# Patient Record
Sex: FEMALE | Race: WHITE | URBAN - METROPOLITAN AREA
[De-identification: names, ages, dates, MRNs, and addresses within clinical notes are randomized per-mention and may not be internally consistent; named-entity substitution may affect disease eponyms.]

---

## 2022-08-31 NOTE — PROGRESS NOTES
ANNUAL EXAM AGES 40-64      Palmira Joyner is a ,  59 y.o. female   No LMP recorded. Patient is postmenopausal.    She presents for her annual checkup. She is having no significant problems. Menstrual status:  Her periods are  absent patient is postmenopausal.    Contraception:  The current method of family planning is NA post menopause. Hormonal status:  She reports no perimenstrual type symptoms. She is not having vasomotor symptoms. The patient is not using any ERT. Sexual history:  She  reports being sexually active and has had partner(s) who are male. Medical conditions:  Since her last annual GYN exam about one year ago, she has not the following changes in her health history: none. Pap and Mammogram History:  Her most recent Pap smear was normal, obtained 4/15/2021. The patient had breast MRI 2021 negative. Gyn Flowsheet:  No flowsheet data found. Breast Cancer History: sister aunt. Osteoporosis History:  Family history does not include a first or second degree relative with osteopenia or osteoporosis. A bone density scan was obtained 2020 and revealed Osteoporosis. She is currently taking calcium and Prolia.       Medical History:  Patient Active Problem List   Diagnosis Code    Family history of breast cancer Z80.3    Osteoporosis M81.0       Past Medical History:   Diagnosis Date    Anxiety     BRCA negative 2021    Depression     Osteoporosis 2020    T-2.9 spine, T-1.5 hip  Prolia     Past Surgical History:   Procedure Laterality Date    HX BLEPHAROPLASTY Bilateral 2022    HX CARPAL TUNNEL RELEASE Right 2022    Trigger & Carpal    HX COLONOSCOPY       OB History    Para Term  AB Living   3 3 3 0 0 3   SAB IAB Ectopic Molar Multiple Live Births   0 0 0 0 0 3      # Outcome Date GA Lbr Onofre/2nd Weight Sex Delivery Anes PTL Lv   3 Term    8 lb 7 oz (3.827 kg)  Vag-Spont   GILBERTO   2 Term    8 lb 5 oz (3.771 kg)  Vag-Spont GILBERTO   1 Term    8 lb 6 oz (3.799 kg)  Vag-Spont   GLIBERTO     Social History     Socioeconomic History    Marital status:     Number of children: 3   Occupational History    Occupation: RN     Employer: [employer]     Comment: [employer]   Tobacco Use    Smoking status: Never    Smokeless tobacco: Never   Vaping Use    Vaping Use: Never used   Substance and Sexual Activity    Alcohol use: Yes    Drug use: Never    Sexual activity: Yes     Partners: Male      Family History   Problem Relation Age of Onset    Diabetes Father     High Cholesterol Father     Hypertension Father     Breast Cancer Sister 52    Breast Cancer Paternal Aunt 61     Current Outpatient Medications on File Prior to Visit   Medication Sig Dispense Refill    denosumab (Prolia) 60 mg/mL injection by SubCUTAneous route. verapamil ER (CALAN-SR) 240 mg CR tablet Take 240 mg by mouth daily. sertraline (ZOLOFT) 25 mg tablet Take  by mouth daily. cholecalciferol, vitamin D3, (Vitamin D3) 50 mcg (2,000 unit) tab Take  by mouth.      calcium carbonate/vitamin D3 (CALCIUM 600 + D,3, PO) Take  by mouth. No current facility-administered medications on file prior to visit.      Allergies   Allergen Reactions    Latex Itching and Rash     Lips swell    Penicillins Rash       Review of Systems:  History obtained from the patient-negative for:  Constitutional: weight loss, fever, night sweats  HEENT: hearing loss, earache, congestion, snoring, sorethroat  CV: chest pain, palpitations, edema  Resp: cough, shortness of breath, wheezing  Breast: breast lumps, nipple discharge, galactorrhea  GI: change in bowel habits, abdominal pain, black or bloody stools  : frequency, dysuria, hematuria, vaginal discharge  MSK: back pain, joint pain, muscle pain  Skin: itching, rash, hives  Neuro: dizziness, headache, confusion, weakness  Psych: anxiety, depression, change in mood  Heme/lymph: bleeding, bruising, pallor    Physical Exam  Visit Vitals  /78   Ht 5' 3.5\" (1.613 m)   Wt 155 lb (70.3 kg)   BMI 27.03 kg/m²       Constitutional  Appearance: well-nourished, well developed, alert, in no acute distress    HENT  Head and Face: appears normal    Neck  Inspection/Palpation: normal appearance, no masses or tenderness  Lymph Nodes: no lymphadenopathy present  Thyroid: gland size normal, nontender, no nodules or masses present on palpation    Chest  Respiratory Effort: breathing unlabored  Auscultation: normal breath sounds    Cardiovascular  Heart:   Auscultation: regular rate and rhythm without murmur    Breasts  Inspection of Breasts: breasts symmetrical, no skin changes, no discharge present, nipple appearance normal, no skin retraction present  Palpation of Breasts and Axillae: no masses present on palpation, no breast tenderness  Axillary Lymph Nodes: no lymphadenopathy present    Gastrointestinal  Abdominal Examination: abdomen non-tender to palpation, normal bowel sounds, no masses present  Liver and spleen: no hepatomegaly present, spleen not palpable  Hernias: no hernias identified    Genitourinary  External Genitalia: normal appearance for age, no discharge present, no tenderness present, no inflammatory lesions present, no masses present, atrophy present  Vagina: normal vaginal mild cycotocel, no discharge present, no inflammatory lesions present, no masses present, atrophy  Bladder: non-tender to palpation  Urethra: appears normal  Cervix: normal   Uterus: normal size, shape and consistency  Adnexa: no adnexal tenderness present, no adnexal masses present  Perineum: perineum within normal limits, no evidence of trauma, no rashes or skin lesions present  Anus: anus within normal limits, no hemorrhoids present,   Inguinal Lymph Nodes: no lymphadenopathy present    Skin  General Inspection: no rash, no lesions identified    Neurologic/Psychiatric  Mental Status:  Orientation: grossly oriented to person, place and time  Mood and Affect: mood normal, affect appropriate    Labs:  No results found for this or any previous visit (from the past 12 hour(s)). Assessment:    ICD-10-CM ICD-9-CM    1.  Routine gynecological examination  Z01.419 V72.31 PAP IG, APTIMA HPV AND RFX 16/18,45 ([id number])        Plan:  Counseled re: diet, exercise, healthy lifestyle  Rec annual mammogram/MRI  Return in about 1 year (around 9/1/2023) for Annual.

## 2022-09-01 ENCOUNTER — OFFICE VISIT (OUTPATIENT)
Dept: OBGYN CLINIC | Age: 57
End: 2022-09-01
Payer: COMMERCIAL

## 2022-09-01 VITALS
SYSTOLIC BLOOD PRESSURE: 118 MMHG | BODY MASS INDEX: 26.46 KG/M2 | DIASTOLIC BLOOD PRESSURE: 78 MMHG | WEIGHT: 155 LBS | HEIGHT: 64 IN

## 2022-09-01 DIAGNOSIS — Z01.419 ROUTINE GYNECOLOGICAL EXAMINATION: Primary | ICD-10-CM

## 2022-09-01 PROBLEM — M81.0 OSTEOPOROSIS: Status: ACTIVE | Noted: 2020-04-24

## 2022-09-01 PROBLEM — Z80.3 FAMILY HISTORY OF BREAST CANCER: Status: ACTIVE | Noted: 2022-09-01

## 2022-09-01 PROCEDURE — 99396 PREV VISIT EST AGE 40-64: CPT | Performed by: OBSTETRICS & GYNECOLOGY

## 2022-09-01 RX ORDER — CHOLECALCIFEROL (VITAMIN D3) 125 MCG
CAPSULE ORAL
COMMUNITY

## 2022-09-01 RX ORDER — DENOSUMAB 60 MG/ML
INJECTION SUBCUTANEOUS
COMMUNITY

## 2022-09-01 RX ORDER — VERAPAMIL HYDROCHLORIDE 240 MG/1
240 TABLET, FILM COATED, EXTENDED RELEASE ORAL DAILY
COMMUNITY
Start: 2022-07-30

## 2022-09-01 RX ORDER — SERTRALINE HYDROCHLORIDE 25 MG/1
TABLET, FILM COATED ORAL DAILY
COMMUNITY

## 2022-09-06 LAB
CYTOLOGIST CVX/VAG CYTO: NORMAL
CYTOLOGY CVX/VAG DOC CYTO: NORMAL
CYTOLOGY CVX/VAG DOC THIN PREP: NORMAL
DX ICD CODE: NORMAL
HPV I/H RISK 4 DNA CVX QL PROBE+SIG AMP: NEGATIVE
Lab: NORMAL
OTHER STN SPEC: NORMAL
STAT OF ADQ CVX/VAG CYTO-IMP: NORMAL

## 2023-09-15 NOTE — PROGRESS NOTES
Joseph Chappell is a 59 y.o. female returns for an annual exam     Chief Complaint   Patient presents with    Annual Exam       No LMP recorded. Patient is postmenopausal.  Her periods are absent in flow. Problems: no problems  Birth Control: post menopausal status. Last Pap: normal obtained 1 year(s) ago. She does not have a history of LINDA 2, 3 or cervical cancer. Last Mammogram: has not had a recent mammogram.     Last Bone Density: abnormal Osteoporosis obtained 4/24/2020. Last colonoscopy: normal obtained 2014. 1. Have you been to the ER, urgent care clinic, or hospitalized since your last visit? No    2. Have you seen or consulted any other health care providers outside of the [address] [place] [address] Avenue since your last visit? Right hip replacement 7/2023. Examination chaperoned by Rayna Armstrong.

## 2023-09-18 ENCOUNTER — OFFICE VISIT (OUTPATIENT)
Age: 58
End: 2023-09-18
Payer: COMMERCIAL

## 2023-09-18 VITALS
SYSTOLIC BLOOD PRESSURE: 139 MMHG | DIASTOLIC BLOOD PRESSURE: 87 MMHG | WEIGHT: 149.2 LBS | BODY MASS INDEX: 25.47 KG/M2 | HEIGHT: 64 IN

## 2023-09-18 DIAGNOSIS — Z01.419 WELL WOMAN EXAM WITH ROUTINE GYNECOLOGICAL EXAM: Primary | ICD-10-CM

## 2023-09-18 DIAGNOSIS — Z12.31 BREAST CANCER SCREENING BY MAMMOGRAM: ICD-10-CM

## 2023-09-18 PROBLEM — G56.03 BILATERAL CARPAL TUNNEL SYNDROME: Status: ACTIVE | Noted: 2022-06-08

## 2023-09-18 PROBLEM — M81.0 OSTEOPOROSIS: Status: ACTIVE | Noted: 2020-04-24

## 2023-09-18 PROCEDURE — 99396 PREV VISIT EST AGE 40-64: CPT | Performed by: OBSTETRICS & GYNECOLOGY

## 2023-09-18 RX ORDER — ASPIRIN 325 MG
TABLET, DELAYED RELEASE (ENTERIC COATED) ORAL
COMMUNITY
Start: 2023-07-05 | End: 2023-09-18

## 2023-09-18 NOTE — PROGRESS NOTES
ANNUAL EXAM AGES 40-64      History:  Fernando Gauthier is a 59y.o. year old  White (non-) female   No LMP recorded. Patient is postmenopausal.    She presents for her annual checkup. Had hip replacement   No LMP recorded. Patient is postmenopausal.  No PMB. Rare hot flash. Her periods are absent in flow. Problems: no problems  Birth Control: post menopausal status. Last Pap: normal obtained 1 year(s) ago. She does not have a history of LINDA 2, 3 or cervical cancer. Last Mammogram: has not had a recent mammogram.     Last Bone Density: abnormal Osteoporosis obtained 2020. Last colonoscopy: normal obtained . Problem List:  Patient Active Problem List    Diagnosis Date Noted    Family history of breast cancer 2022     Overview Note:     Sister  Aunt  BRCA Neg  Get Q6mo mammo        Bilateral carpal tunnel syndrome 2022     Overview Note:     Formatting of this note might be different from the original.  Added automatically from request for surgery [id number]      Osteoporosis 2020     Overview Note:     T-2.9 spine, T-1.5 hip  Prolia  Scan  still osteoporotic per patient.   Dr. Jose Luis Sawant         Past Medical History:   Diagnosis Date    Anxiety     BRCA negative 2021    Depression     Osteoporosis 2020    T-2.9 spine, T-1.5 hip  Prolia     Past Surgical History:   Procedure Laterality Date    BLEPHAROPLASTY Bilateral 2022    CARPAL TUNNEL RELEASE Right 2022    Trigger & Carpal    COLONOSCOPY  2014    TOTAL HIP ARTHROPLASTY Right 2023       OB History    Para Term  AB Living   3 3 3 0 0 3   SAB IAB Ectopic Molar Multiple Live Births   0 0 0 0 0 3      # Outcome Date GA Lbr Bucky/2nd Weight Sex Delivery Anes PTL Lv   3 Term    8 lb 7 oz (3.827 kg)  Vag-Spont   KATELYNN   2 Term    8 lb 5 oz (3.771 kg)  Vag-Spont   KATELYNN   1 Term    8 lb 6 oz (3.799 kg)  Vag-Spont   KATELYNN     Social History     Socioeconomic History    Marital

## 2023-09-23 LAB
CYTOLOGIST CVX/VAG CYTO: NORMAL
CYTOLOGY CVX/VAG DOC CYTO: NORMAL
CYTOLOGY CVX/VAG DOC THIN PREP: NORMAL
DX ICD CODE: NORMAL
HPV GENOTYPE REFLEX: NORMAL
HPV I/H RISK 4 DNA CVX QL PROBE+SIG AMP: NEGATIVE
Lab: NORMAL
OTHER STN SPEC: NORMAL
STAT OF ADQ CVX/VAG CYTO-IMP: NORMAL

## 2025-01-14 ENCOUNTER — OFFICE VISIT (OUTPATIENT)
Age: 60
End: 2025-01-14
Payer: COMMERCIAL

## 2025-01-14 VITALS
BODY MASS INDEX: 28.31 KG/M2 | DIASTOLIC BLOOD PRESSURE: 87 MMHG | SYSTOLIC BLOOD PRESSURE: 137 MMHG | HEIGHT: 64 IN | RESPIRATION RATE: 16 BRPM | WEIGHT: 165.8 LBS | HEART RATE: 76 BPM

## 2025-01-14 DIAGNOSIS — Z01.419 WELL WOMAN EXAM WITH ROUTINE GYNECOLOGICAL EXAM: Primary | ICD-10-CM

## 2025-01-14 PROBLEM — I10 HYPERTENSION: Status: ACTIVE | Noted: 2025-01-14

## 2025-01-14 PROCEDURE — 3075F SYST BP GE 130 - 139MM HG: CPT | Performed by: OBSTETRICS & GYNECOLOGY

## 2025-01-14 PROCEDURE — 99396 PREV VISIT EST AGE 40-64: CPT | Performed by: OBSTETRICS & GYNECOLOGY

## 2025-01-14 PROCEDURE — 3079F DIAST BP 80-89 MM HG: CPT | Performed by: OBSTETRICS & GYNECOLOGY

## 2025-01-14 NOTE — PROGRESS NOTES
Felicia Campbell Jr. is a 59 y.o. female returns for an annual exam     Chief Complaint   Patient presents with    Annual Exam       No LMP recorded. Patient is postmenopausal.  Her periods are absent in flow.  Problems: no problems  Birth Control: post menopausal status.  Last Pap: normal obtained 9/18/2023.  She does not have a history of LINDA 2, 3 or cervical cancer.   Last Mammogram: had her mammogram today in our office.     Last Bone Density: abnormal Osteoporosis obtained 6/19/2024 [facility].    Last colonoscopy: normal obtained 2014.      1. Have you been to the ER, urgent care clinic, or hospitalized since your last visit? No    2. Have you seen or consulted any other health care providers outside of the [facility] since your last visit? Yes    Examination chaperoned by GABBIE EAST CMA.

## 2025-01-14 NOTE — PROGRESS NOTES
ANNUAL EXAM AGES 40-64      History:  Felicia Campbell Jr. is a 59 y.o. year old  White (non-) female   No LMP recorded. Patient is postmenopausal.    She presents for her annual checkup.     No LMP recorded. Patient is postmenopausal.  Her periods are absent in flow.  Problems: no problems  Birth Control: post menopausal status.  Last Pap: normal obtained 2023.  She does not have a history of LINDA 2, 3 or cervical cancer.   Last Mammogram: had her mammogram today in our office.     Last Bone Density: abnormal Osteoporosis obtained 2024 [facility].    Last colonoscopy: normal obtained .       Problem List:  Patient Active Problem List    Diagnosis Date Noted    Hypertension 2025    Family history of breast cancer 2022     Overview Note:     Sister  Aunt  BRCA Neg  Get Q6mo mammo        Bilateral carpal tunnel syndrome 2022     Overview Note:     Formatting of this note might be different from the original.  Added automatically from request for surgery [id number]      Osteoporosis 2020     Overview Note:     T-2.9 spine, T-1.5 hip  Prolia  Scan  still osteoporotic per patient.  Dr. Arias   repeat still osteoporotic -- on prolia         Past Medical History:   Diagnosis Date    Anxiety     BRCA negative 2021    Depression     Hypertension     Osteoporosis 2020    T-2.9 spine, T-1.5 hip  Prolia     Past Surgical History:   Procedure Laterality Date    BLEPHAROPLASTY Bilateral 2022    BREAST BIOPSY Left     CARPAL TUNNEL RELEASE Right 2022    Trigger & Carpal    COLONOSCOPY  2014    TOTAL HIP ARTHROPLASTY Right 2023       OB History    Para Term  AB Living   3 3 3 0 0 3   SAB IAB Ectopic Molar Multiple Live Births   0 0 0 0 0 3      # Outcome Date GA Lbr Bucky/2nd Weight Sex Type Anes PTL Lv   3 Term    3.827 kg (8 lb 7 oz)  Vag-Spont   KATELYNN   2 Term    3.771 kg (8 lb 5 oz)  Vag-Spont   KATELYNN   1 Term    3.799 kg (8 lb
